# Patient Record
Sex: FEMALE | Race: OTHER | NOT HISPANIC OR LATINO
[De-identification: names, ages, dates, MRNs, and addresses within clinical notes are randomized per-mention and may not be internally consistent; named-entity substitution may affect disease eponyms.]

---

## 2021-06-30 ENCOUNTER — APPOINTMENT (OUTPATIENT)
Dept: FAMILY MEDICINE | Facility: CLINIC | Age: 21
End: 2021-06-30
Payer: COMMERCIAL

## 2021-06-30 ENCOUNTER — NON-APPOINTMENT (OUTPATIENT)
Age: 21
End: 2021-06-30

## 2021-06-30 VITALS
SYSTOLIC BLOOD PRESSURE: 96 MMHG | RESPIRATION RATE: 15 BRPM | HEIGHT: 64 IN | TEMPERATURE: 97.6 F | WEIGHT: 120 LBS | DIASTOLIC BLOOD PRESSURE: 61 MMHG | OXYGEN SATURATION: 97 % | BODY MASS INDEX: 20.49 KG/M2 | HEART RATE: 101 BPM

## 2021-06-30 DIAGNOSIS — R10.9 UNSPECIFIED ABDOMINAL PAIN: ICD-10-CM

## 2021-06-30 DIAGNOSIS — Z00.00 ENCOUNTER FOR GENERAL ADULT MEDICAL EXAMINATION W/OUT ABNORMAL FINDINGS: ICD-10-CM

## 2021-06-30 DIAGNOSIS — F90.9 ATTENTION-DEFICIT HYPERACTIVITY DISORDER, UNSPECIFIED TYPE: ICD-10-CM

## 2021-06-30 PROCEDURE — 99072 ADDL SUPL MATRL&STAF TM PHE: CPT

## 2021-06-30 PROCEDURE — 99385 PREV VISIT NEW AGE 18-39: CPT

## 2021-06-30 NOTE — HEALTH RISK ASSESSMENT
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

## 2021-06-30 NOTE — ASSESSMENT
[FreeTextEntry1] : 21 h/o lactose intolerance, ADHD \par Had work up with GI at 13 for abdominal pain, endoscopy was negative. She did a trial of lactose elimination from diet which improved a little. \par \par HCM \par - Pap smear- never, instructed to f/u ob gyn, referral line given \par - Never had gardasil- she will like to defer for now \par - COVID-10 Pzier 2/2 \par - Fu cbc and cmp \par - Would like to defer STD testing at the moment \par \par ADHD\par - Addreral XL 30 mg qd\par - Patient instructed to fu with Psych for re eval \par - Patient understands we will refill med once here but further prescriptions need to be by psych \par \par Abdominal pain \par - Food diary \par - Cont lactose \par - Had GI work up years ago (Including EGD with normal results), diagnosed with possible IBS \par - Fu if GI symptoms persist or worsen

## 2021-06-30 NOTE — REVIEW OF SYSTEMS
[Negative] : Heme/Lymph [Nausea] : no nausea [Constipation] : no constipation [Diarrhea] : diarrhea [Vomiting] : no vomiting [Heartburn] : no heartburn [Melena] : no melena [FreeTextEntry7] : Occasional bloating, abdominal pain, normal bowel movement

## 2021-06-30 NOTE — HISTORY OF PRESENT ILLNESS
[de-identified] : 21 h/o lactose intolerance, ADHD \par Had work up with GI at 13 for abdominal pain, endoscopy was negative. She did a trial of lactose elimination from diet which improved a little. \par \par HCM \par - Pap smear- never \par - Never had gardasil\par - COVID-10 Pxier 2/2 \par \par Addreral XL 30 mg qd

## 2021-08-04 RX ORDER — DEXTROAMPHETAMINE SACCHARATE, AMPHETAMINE ASPARTATE MONOHYDRATE, DEXTROAMPHETAMINE SULFATE AND AMPHETAMINE SULFATE 7.5; 7.5; 7.5; 7.5 MG/1; MG/1; MG/1; MG/1
30 CAPSULE, EXTENDED RELEASE ORAL
Qty: 30 | Refills: 0 | Status: DISCONTINUED | COMMUNITY
Start: 2021-06-30 | End: 2021-08-04

## 2021-08-04 RX ORDER — DEXTROAMPHETAMINE SACCHARATE, AMPHETAMINE ASPARTATE MONOHYDRATE, DEXTROAMPHETAMINE SULFATE AND AMPHETAMINE SULFATE 7.5; 7.5; 7.5; 7.5 MG/1; MG/1; MG/1; MG/1
30 CAPSULE, EXTENDED RELEASE ORAL
Qty: 30 | Refills: 0 | Status: ACTIVE | COMMUNITY
Start: 2021-08-04 | End: 1900-01-01

## 2025-07-29 ENCOUNTER — NON-APPOINTMENT (OUTPATIENT)
Age: 25
End: 2025-07-29

## 2025-07-31 ENCOUNTER — EMERGENCY (EMERGENCY)
Facility: HOSPITAL | Age: 25
LOS: 1 days | End: 2025-07-31
Attending: EMERGENCY MEDICINE | Admitting: EMERGENCY MEDICINE
Payer: COMMERCIAL

## 2025-07-31 VITALS
WEIGHT: 110.01 LBS | SYSTOLIC BLOOD PRESSURE: 117 MMHG | OXYGEN SATURATION: 100 % | TEMPERATURE: 98 F | HEART RATE: 57 BPM | RESPIRATION RATE: 19 BRPM | HEIGHT: 64 IN | DIASTOLIC BLOOD PRESSURE: 68 MMHG

## 2025-07-31 PROCEDURE — 99282 EMERGENCY DEPT VISIT SF MDM: CPT

## 2025-07-31 PROCEDURE — 99284 EMERGENCY DEPT VISIT MOD MDM: CPT

## 2025-07-31 RX ORDER — TETRACAINE HYDROCHLORIDE 5 MG/ML
1 SOLUTION OPHTHALMIC ONCE
Refills: 0 | Status: COMPLETED | OUTPATIENT
Start: 2025-07-31 | End: 2025-07-31

## 2025-07-31 RX ADMIN — TETRACAINE HYDROCHLORIDE 1 DROP(S): 5 SOLUTION OPHTHALMIC at 23:47

## 2025-08-01 VITALS
SYSTOLIC BLOOD PRESSURE: 112 MMHG | OXYGEN SATURATION: 100 % | TEMPERATURE: 98 F | RESPIRATION RATE: 20 BRPM | DIASTOLIC BLOOD PRESSURE: 64 MMHG | HEART RATE: 56 BPM